# Patient Record
Sex: FEMALE | Race: WHITE | NOT HISPANIC OR LATINO | Employment: OTHER | URBAN - NONMETROPOLITAN AREA
[De-identification: names, ages, dates, MRNs, and addresses within clinical notes are randomized per-mention and may not be internally consistent; named-entity substitution may affect disease eponyms.]

---

## 2023-06-23 PROBLEM — R60.9 SWELLING: Status: ACTIVE | Noted: 2023-06-23

## 2023-06-23 PROBLEM — F51.01 PRIMARY INSOMNIA: Status: ACTIVE | Noted: 2023-06-23

## 2023-06-23 PROBLEM — I10 HYPERTENSION: Status: ACTIVE | Noted: 2023-06-23

## 2023-07-06 PROBLEM — E66.811 CLASS 1 OBESITY DUE TO EXCESS CALORIES WITHOUT SERIOUS COMORBIDITY WITH BODY MASS INDEX (BMI) OF 30.0 TO 30.9 IN ADULT: Status: ACTIVE | Noted: 2023-07-06

## 2023-07-06 PROBLEM — E66.09 CLASS 1 OBESITY DUE TO EXCESS CALORIES WITHOUT SERIOUS COMORBIDITY WITH BODY MASS INDEX (BMI) OF 30.0 TO 30.9 IN ADULT: Status: ACTIVE | Noted: 2023-07-06

## 2023-07-07 ENCOUNTER — TELEPHONE (OUTPATIENT)
Dept: FAMILY MEDICINE CLINIC | Facility: CLINIC | Age: 48
End: 2023-07-07

## 2023-07-07 NOTE — TELEPHONE ENCOUNTER
Caller: Vero Kraus    Relationship to patient: Self    Best call back number: [phone]     PATIENT IS CALLING TO STATE THAT INSURANCE NEEDS A PRIOR AUTHORIZATION FOR THE MEDICATION THAT DR SMITH PRESCRBED FOR HER.  IF THEY DO NOT APPROVE THIS MEDICATION, THEN THEY MIGHT APPROVE WYJENNIFER OR SAXENDA.

## 2023-07-10 NOTE — TELEPHONE ENCOUNTER
Caller: Vero Kraus    Relationship: Self    Best call back number:     [phone]       What was the call regarding:   PATIENT WOULD LIKE A CALL BACK REGARDING THE STATUS OF HER PRIOR AUTHORIZATION ON HER MEDICATION     Tirzepatide (Mounjaro) 2.5 MG/0.5ML solution pen-injector

## 2023-07-11 NOTE — TELEPHONE ENCOUNTER
Called patient to inform her of the PA denial on Mounjaro. Patient verbalized understanding and would like to discuss other options with Dr. Cook. I did inform patient that the [facility] rep told us it was on backorder.

## 2023-08-10 ENCOUNTER — OFFICE VISIT (OUTPATIENT)
Dept: FAMILY MEDICINE CLINIC | Facility: CLINIC | Age: 48
End: 2023-08-10
Payer: OTHER GOVERNMENT

## 2023-08-10 VITALS
HEART RATE: 78 BPM | WEIGHT: 181.8 LBS | DIASTOLIC BLOOD PRESSURE: 68 MMHG | BODY MASS INDEX: 30.29 KG/M2 | RESPIRATION RATE: 20 BRPM | HEIGHT: 65 IN | SYSTOLIC BLOOD PRESSURE: 108 MMHG | TEMPERATURE: 98 F | OXYGEN SATURATION: 94 %

## 2023-08-10 DIAGNOSIS — E66.09 CLASS 1 OBESITY DUE TO EXCESS CALORIES WITHOUT SERIOUS COMORBIDITY WITH BODY MASS INDEX (BMI) OF 30.0 TO 30.9 IN ADULT: Primary | ICD-10-CM

## 2023-08-10 PROCEDURE — 99214 OFFICE O/P EST MOD 30 MIN: CPT | Performed by: PSYCHOLOGIST

## 2023-08-10 RX ORDER — NALTREXONE HYDROCHLORIDE AND BUPROPION HYDROCHLORIDE 8; 90 MG/1; MG/1
TABLET, EXTENDED RELEASE ORAL
Qty: 120 TABLET | Refills: 0 | Status: SHIPPED | OUTPATIENT
Start: 2023-08-10

## 2023-08-10 NOTE — PROGRESS NOTES
"Chief Complaint  Follow-up (Obesity medication - Mounjaro was denied)    Subjective        Vero Kraus presents to [facility]  C/o follow up on obesity/ mounjaro not covered on her ins:  Obesity  This is a chronic problem. The current episode started more than 1 year ago. The problem occurs constantly. The problem has been gradually worsening. Associated symptoms include fatigue. Pertinent negatives include no chest pain, fever, headaches, joint swelling or vomiting. The symptoms are aggravated by walking. She has tried walking for the symptoms. The treatment provided mild relief.     Objective   Vital Signs:  /68 (BP Location: Left arm, Patient Position: Sitting, Cuff Size: Adult)   Pulse 78   Temp 98 øF (36.7 øC) (Temporal)   Resp 20   Ht 163.8 cm (64.5\")   Wt 82.5 kg (181 lb 12.8 oz)   SpO2 94%   BMI 30.72 kg/mý   Estimated body mass index is 30.72 kg/mý as calculated from the following:    Height as of this encounter: 163.8 cm (64.5\").    Weight as of this encounter: 82.5 kg (181 lb 12.8 oz).            Physical Exam  Vitals and nursing note reviewed.   Constitutional:       Appearance: Normal appearance. She is obese.   HENT:      Head: Normocephalic.      Right Ear: Tympanic membrane normal.      Left Ear: Tympanic membrane normal.      Nose: Nose normal.      Mouth/Throat:      Mouth: Mucous membranes are moist.   Eyes:      Extraocular Movements: Extraocular movements intact.      Pupils: Pupils are equal, round, and reactive to light.   Cardiovascular:      Rate and Rhythm: Normal rate and regular rhythm.      Pulses: Normal pulses.      Heart sounds: Normal heart sounds.   Pulmonary:      Effort: Pulmonary effort is normal.      Breath sounds: Normal breath sounds.   Abdominal:      General: Abdomen is protuberant. Bowel sounds are normal.      Palpations: Abdomen is soft.   Musculoskeletal:         General: Normal range of motion.      Cervical back: Normal " range of motion and neck supple.   Skin:     General: Skin is warm.      Capillary Refill: Capillary refill takes less than 2 seconds.   Neurological:      General: No focal deficit present.      Mental Status: She is alert and oriented to person, place, and time.   Psychiatric:         Mood and Affect: Mood normal.      Result Review :  The following data was reviewed by: Hilario Cook MD on 08/10/2023:  Common labs          6/23/2023    14:14   Common Labs   Glucose 88    BUN 14    Creatinine 0.79    Sodium 142    Potassium 4.6    Chloride 107    Calcium 9.2    Albumin 4.3    Total Bilirubin 0.3    Alkaline Phosphatase 73    AST (SGOT) 20    ALT (SGPT) 10    WBC 6.35    Hemoglobin 11.1    Hematocrit 37.3    Platelets 294    Total Cholesterol 150    Triglycerides 110    HDL Cholesterol 40    LDL Cholesterol  90    Hemoglobin A1C 5.50             Assessment and Plan   Diagnoses and all orders for this visit:    1. Class 1 obesity due to excess calories without serious comorbidity with body mass index (BMI) of 30.0 to 30.9 in adult (Primary)  Assessment & Plan:  Patient's (Body mass index is 30.72 kg/mý.) indicates that they are obese (BMI >30) with health conditions that include obstructive sleep apnea, hypertension, coronary heart disease, diabetes mellitus, dyslipidemias, GERD, and osteoarthritis . Weight is worsening. BMI  is above average; BMI management plan is completed. We discussed portion control and increasing exercise.     Will start a trial of Contrave  x 1 month and re-evaluate           Other orders  -     naltrexone-bupropion ER (Contrave) 8-90 MG tablet; Take 1 tab daily x  week then 1 tab bid x 1 week , the 2 tabs in am and  tab in pm then 2 tabs am & pm  Dispense: 120 tablet; Refill: 0           I spent 30 minutes caring for Vero on this date of service. This time includes time spent by me in the following activities:reviewing tests, obtaining and/or reviewing a separately obtained  history, performing a medically appropriate examination and/or evaluation , counseling and educating the patient/family/caregiver, ordering medications, tests, or procedures, and documenting information in the medical record     Counseling was given to patient for the following topics: risks and benefits of treatment options and importance of treatment compliance . Total time of the encounter was 30 minutes and 10 minutes was spent counseling.    Follow Up   No follow-ups on file.  Patient was given instructions and counseling regarding her condition or for health maintenance advice. Please see specific information pulled into the AVS if appropriate.           This document has been electronically signed by Hilario Cook MD  August 10, 2023 11:49 EDT

## 2023-08-10 NOTE — ASSESSMENT & PLAN NOTE
Patient's (Body mass index is 30.72 kg/mý.) indicates that they are obese (BMI >30) with health conditions that include obstructive sleep apnea, hypertension, coronary heart disease, diabetes mellitus, dyslipidemias, GERD, and osteoarthritis . Weight is worsening. BMI  is above average; BMI management plan is completed. We discussed portion control and increasing exercise.     Will start a trial of Contrave  x 1 month and re-evaluate

## 2023-08-16 ENCOUNTER — TELEPHONE (OUTPATIENT)
Dept: FAMILY MEDICINE CLINIC | Facility: CLINIC | Age: 48
End: 2023-08-16
Payer: OTHER GOVERNMENT

## 2023-08-18 RX ORDER — SEMAGLUTIDE 0.25 MG/.5ML
0.25 INJECTION, SOLUTION SUBCUTANEOUS WEEKLY
Qty: 0.5 ML | Refills: 0 | Status: SHIPPED | OUTPATIENT
Start: 2023-08-18 | End: 2023-09-09

## 2023-08-18 RX ORDER — SEMAGLUTIDE 0.25 MG/.5ML
0.25 INJECTION, SOLUTION SUBCUTANEOUS WEEKLY
Qty: 0.5 ML | Refills: 0 | Status: SHIPPED | OUTPATIENT
Start: 2023-08-18 | End: 2023-08-18 | Stop reason: SDUPTHER

## 2023-08-21 ENCOUNTER — PATIENT MESSAGE (OUTPATIENT)
Dept: FAMILY MEDICINE CLINIC | Facility: CLINIC | Age: 48
End: 2023-08-21
Payer: OTHER GOVERNMENT

## 2023-08-21 NOTE — TELEPHONE ENCOUNTER
From: Vero Kraus  To: Hilario Cook  Sent: 8/21/2023 9:47 AM EDT  Subject: Wugovy    Hi Dr [facility] no longer accepts  Insurance. [facility] and [facility] are the only other pharmacies that accept  and both of them don't have Wugovy in stock. It's on back order and they don't have a date that they will have it in stock. The only other option I can see for getting Wugovy is to go through [facility] which [facility]. We've never used them before so I have no idea how the process works with them other than they do home delivery. Can you try sending the Wugovy through them?    Thank you,   Vero Kraus

## 2023-08-25 RX ORDER — SEMAGLUTIDE 0.25 MG/.5ML
0.25 INJECTION, SOLUTION SUBCUTANEOUS WEEKLY
Qty: 0.5 ML | Refills: 0 | Status: SHIPPED | OUTPATIENT
Start: 2023-08-25 | End: 2023-09-16

## 2023-09-08 RX ORDER — LISINOPRIL AND HYDROCHLOROTHIAZIDE 20; 12.5 MG/1; MG/1
1 TABLET ORAL
Qty: 30 TABLET | Refills: 2 | Status: SHIPPED | OUTPATIENT
Start: 2023-09-08 | End: 2023-12-07

## 2023-10-19 ENCOUNTER — OFFICE VISIT (OUTPATIENT)
Dept: FAMILY MEDICINE CLINIC | Facility: CLINIC | Age: 48
End: 2023-10-19
Payer: OTHER GOVERNMENT

## 2023-10-19 VITALS
WEIGHT: 183 LBS | HEART RATE: 83 BPM | RESPIRATION RATE: 18 BRPM | OXYGEN SATURATION: 99 % | BODY MASS INDEX: 30.49 KG/M2 | DIASTOLIC BLOOD PRESSURE: 78 MMHG | HEIGHT: 65 IN | TEMPERATURE: 96.4 F | SYSTOLIC BLOOD PRESSURE: 125 MMHG

## 2023-10-19 DIAGNOSIS — E66.09 CLASS 1 OBESITY DUE TO EXCESS CALORIES WITHOUT SERIOUS COMORBIDITY WITH BODY MASS INDEX (BMI) OF 30.0 TO 30.9 IN ADULT: Primary | ICD-10-CM

## 2023-10-19 PROCEDURE — 99213 OFFICE O/P EST LOW 20 MIN: CPT | Performed by: PSYCHOLOGIST

## 2023-10-19 RX ORDER — PHENTERMINE AND TOPIRAMATE 7.5; 46 MG/1; MG/1
1 CAPSULE, EXTENDED RELEASE ORAL DAILY
Qty: 30 CAPSULE | Refills: 0 | Status: SHIPPED | OUTPATIENT
Start: 2023-10-19 | End: 2023-11-18

## 2023-10-19 NOTE — ASSESSMENT & PLAN NOTE
Patient's (Body mass index is 30.93 kg/m².) indicates that they are obese (BMI >30) with health conditions that include obstructive sleep apnea, hypertension, coronary heart disease, diabetes mellitus, dyslipidemias, GERD, and osteoarthritis . Weight is unchanged. BMI  is above average; BMI management plan is completed. We discussed portion control, increasing exercise, and joining a fitness center or start home based exercise program.

## 2023-10-19 NOTE — PROGRESS NOTES
"Chief Complaint  Follow-up (Follow up on Obesity - //Patient was suppose to start Wegovy in august 2023 but was unable to locate due to supply issues.)    Subjective        Vero Kraus presents to [facility]  C/o follow chronic illness:   Obesity  This is a chronic problem. The current episode started more than 1 year ago. The problem occurs constantly. The problem has been gradually worsening. Associated symptoms include fatigue. Pertinent negatives include no fever, headaches or joint swelling. She has tried walking for the symptoms. The treatment provided mild relief.       Objective   Vital Signs:  /78   Pulse 83   Temp 96.4 °F (35.8 °C) (Temporal)   Resp 18   Ht 163.8 cm (64.5\")   Wt 83 kg (183 lb)   SpO2 99%   BMI 30.93 kg/m²   Estimated body mass index is 30.93 kg/m² as calculated from the following:    Height as of this encounter: 163.8 cm (64.5\").    Weight as of this encounter: 83 kg (183 lb).       Physical Exam  Vitals and nursing note reviewed.   Constitutional:       Appearance: Normal appearance. She is obese.   HENT:      Head: Normocephalic.      Right Ear: Tympanic membrane normal.      Left Ear: Tympanic membrane normal.      Nose: Nose normal.      Mouth/Throat:      Mouth: Mucous membranes are moist.   Eyes:      Extraocular Movements: Extraocular movements intact.      Pupils: Pupils are equal, round, and reactive to light.   Cardiovascular:      Rate and Rhythm: Normal rate and regular rhythm.      Pulses: Normal pulses.      Heart sounds: Normal heart sounds.   Pulmonary:      Effort: Pulmonary effort is normal.      Breath sounds: Normal breath sounds.   Abdominal:      General: Abdomen is protuberant. Bowel sounds are normal.      Palpations: Abdomen is soft.   Musculoskeletal:         General: Normal range of motion.      Cervical back: Normal range of motion and neck supple.   Skin:     General: Skin is warm.      Capillary Refill: Capillary " refill takes less than 2 seconds.   Neurological:      General: No focal deficit present.      Mental Status: She is alert and oriented to person, place, and time.   Psychiatric:         Mood and Affect: Mood normal.        Result Review :  The following data was reviewed by: Hilario Cook MD on 10/19/2023:  Common labs          6/23/2023    14:14   Common Labs   Glucose 88    BUN 14    Creatinine 0.79    Sodium 142    Potassium 4.6    Chloride 107    Calcium 9.2    Albumin 4.3    Total Bilirubin 0.3    Alkaline Phosphatase 73    AST (SGOT) 20    ALT (SGPT) 10    WBC 6.35    Hemoglobin 11.1    Hematocrit 37.3    Platelets 294    Total Cholesterol 150    Triglycerides 110    HDL Cholesterol 40    LDL Cholesterol  90    Hemoglobin A1C 5.50      Data reviewed : Radiologic studies 6/23/23 LABS            Assessment and Plan   Diagnoses and all orders for this visit:    1. Class 1 obesity due to excess calories without serious comorbidity with body mass index (BMI) of 30.0 to 30.9 in adult (Primary)  Assessment & Plan:  Patient's (Body mass index is 30.93 kg/m².) indicates that they are obese (BMI >30) with health conditions that include obstructive sleep apnea, hypertension, coronary heart disease, diabetes mellitus, dyslipidemias, GERD, and osteoarthritis . Weight is unchanged. BMI  is above average; BMI management plan is completed. We discussed portion control, increasing exercise, and joining a fitness center or start home based exercise program.     Orders:  -     Phentermine-Topiramate (Qsymia) 7.5-46 MG capsule sustained-release 24 hr; Take 1 tablet by mouth Daily for 30 days.  Dispense: 30 capsule; Refill: 0           I spent 20 minutes caring for Vero on this date of service. This time includes time spent by me in the following activities:reviewing tests, obtaining and/or reviewing a separately obtained history, performing a medically appropriate examination and/or evaluation , counseling and educating  the patient/family/caregiver, ordering medications, tests, or procedures, and documenting information in the medical record       Follow Up   Return in about 1 year (around 10/19/2024) for Recheck-- obesity follow up .  Patient was given instructions and counseling regarding her condition or for health maintenance advice. Please see specific information pulled into the AVS if appropriate.           This document has been electronically signed by Hilario Cook MD  October 19, 2023 11:35 EDT

## 2023-12-07 ENCOUNTER — OFFICE VISIT (OUTPATIENT)
Dept: FAMILY MEDICINE CLINIC | Facility: CLINIC | Age: 48
End: 2023-12-07
Payer: OTHER GOVERNMENT

## 2023-12-07 VITALS
DIASTOLIC BLOOD PRESSURE: 64 MMHG | OXYGEN SATURATION: 97 % | TEMPERATURE: 98.2 F | HEIGHT: 65 IN | WEIGHT: 174.4 LBS | RESPIRATION RATE: 18 BRPM | HEART RATE: 81 BPM | BODY MASS INDEX: 29.06 KG/M2 | SYSTOLIC BLOOD PRESSURE: 102 MMHG

## 2023-12-07 DIAGNOSIS — F51.01 PRIMARY INSOMNIA: ICD-10-CM

## 2023-12-07 DIAGNOSIS — E66.09 CLASS 1 OBESITY DUE TO EXCESS CALORIES WITHOUT SERIOUS COMORBIDITY WITH BODY MASS INDEX (BMI) OF 30.0 TO 30.9 IN ADULT: ICD-10-CM

## 2023-12-07 DIAGNOSIS — I10 HYPERTENSION, UNSPECIFIED TYPE: Primary | ICD-10-CM

## 2023-12-07 PROCEDURE — 99213 OFFICE O/P EST LOW 20 MIN: CPT | Performed by: PSYCHOLOGIST

## 2023-12-07 NOTE — PROGRESS NOTES
"Chief Complaint  Follow-up (Cont. Care of obesity, HTN, insomnia./Patient does not want to take any controlled/habit-forming weight-loss medications.)    Subjective        Vero Kraus presents to [facility]  C/o follow up chronic illness :   Hypertension  This is a chronic problem. The current episode started more than 1 year ago. The problem has been resolved since onset. Pertinent negatives include no chest pain, headaches, palpitations or shortness of breath. Risk factors for coronary artery disease include post-menopausal state and obesity. Past treatments include lifestyle changes and ACE inhibitors. Current antihypertension treatment includes lifestyle changes and ACE inhibitors. The current treatment provides moderate improvement. There are no compliance problems.  There is no history of angina, kidney disease or CAD/MI. There is no history of chronic renal disease or a thyroid problem.   Insomnia  This is a chronic problem. The current episode started more than 1 year ago. The problem has been gradually improving. Pertinent negatives include no chest pain, fatigue, fever or headaches. She has tried rest for the symptoms. The treatment provided moderate relief.   Obesity  This is a chronic problem. The current episode started more than 1 year ago. The problem occurs constantly. The problem has been gradually improving. Pertinent negatives include no chest pain, fatigue, fever or headaches. She has tried walking for the symptoms. The treatment provided moderate relief.         Objective   Vital Signs:  /64   Pulse 81   Temp 98.2 °F (36.8 °C) (Temporal)   Resp 18   Ht 163.8 cm (64.5\")   Wt 79.1 kg (174 lb 6.4 oz)   SpO2 97%   BMI 29.47 kg/m²   Estimated body mass index is 29.47 kg/m² as calculated from the following:    Height as of this encounter: 163.8 cm (64.5\").    Weight as of this encounter: 79.1 kg (174 lb 6.4 oz).            Physical Exam  Vitals and nursing " note reviewed.   Constitutional:       Appearance: Normal appearance. She is obese.   HENT:      Head: Normocephalic.      Right Ear: Tympanic membrane normal.      Left Ear: Tympanic membrane normal.      Nose: Nose normal.      Mouth/Throat:      Mouth: Mucous membranes are moist.   Eyes:      Extraocular Movements: Extraocular movements intact.      Pupils: Pupils are equal, round, and reactive to light.   Cardiovascular:      Rate and Rhythm: Normal rate and regular rhythm.      Pulses: Normal pulses.      Heart sounds: Normal heart sounds.   Pulmonary:      Effort: Pulmonary effort is normal.      Breath sounds: Normal breath sounds.   Abdominal:      General: Abdomen is protuberant. Bowel sounds are normal.      Palpations: Abdomen is soft.   Musculoskeletal:         General: Normal range of motion.      Cervical back: Normal range of motion and neck supple.   Skin:     General: Skin is warm.      Capillary Refill: Capillary refill takes less than 2 seconds.   Neurological:      General: No focal deficit present.      Mental Status: She is alert and oriented to person, place, and time.   Psychiatric:         Mood and Affect: Mood normal.        Result Review :  The following data was reviewed by: Hilario Cook MD on 12/07/2023:  Common labs          6/23/2023    14:14   Common Labs   Glucose 88    BUN 14    Creatinine 0.79    Sodium 142    Potassium 4.6    Chloride 107    Calcium 9.2    Albumin 4.3    Total Bilirubin 0.3    Alkaline Phosphatase 73    AST (SGOT) 20    ALT (SGPT) 10    WBC 6.35    Hemoglobin 11.1    Hematocrit 37.3    Platelets 294    Total Cholesterol 150    Triglycerides 110    HDL Cholesterol 40    LDL Cholesterol  90    Hemoglobin A1C 5.50               Assessment and Plan   Diagnoses and all orders for this visit:    1. Hypertension, unspecified type (Primary)  Assessment & Plan:  Hypertension is improving with treatment.  Continue current treatment regimen.  Dietary sodium  restriction.  Weight loss.  Regular aerobic exercise.  Blood pressure will be reassessed at the next regular appointment.      2. Class 1 obesity due to excess calories without serious comorbidity with body mass index (BMI) of 30.0 to 30.9 in adult  Assessment & Plan:  Patient's (Body mass index is 29.47 kg/m².) indicates that they are obese (BMI >30) with health conditions that include obstructive sleep apnea, hypertension, coronary heart disease, diabetes mellitus, dyslipidemias, GERD, and osteoarthritis . Weight is improving with treatment. BMI  is above average; BMI management plan is completed. We discussed portion control, increasing exercise, and joining a fitness center or start home based exercise program.       3. Primary insomnia  Assessment & Plan:  Doing well with current med             I spent 20 minutes caring for Vero on this date of service. This time includes time spent by me in the following activities:reviewing tests, obtaining and/or reviewing a separately obtained history, performing a medically appropriate examination and/or evaluation , counseling and educating the patient/family/caregiver, ordering medications, tests, or procedures, and documenting information in the medical record     Follow Up   Return in about 3 months (around 3/7/2024) for Recheck, RTC FASTING LABS.  Patient was given instructions and counseling regarding her condition or for health maintenance advice. Please see specific information pulled into the AVS if appropriate.           This document has been electronically signed by Hilario Cook MD  December 7, 2023 14:01 EST

## 2023-12-14 RX ORDER — LISINOPRIL AND HYDROCHLOROTHIAZIDE 20; 12.5 MG/1; MG/1
1 TABLET ORAL
Qty: 30 TABLET | Refills: 2 | Status: SHIPPED | OUTPATIENT
Start: 2023-12-14 | End: 2024-03-13

## 2024-03-08 ENCOUNTER — OFFICE VISIT (OUTPATIENT)
Dept: FAMILY MEDICINE CLINIC | Facility: CLINIC | Age: 49
End: 2024-03-08
Payer: OTHER GOVERNMENT

## 2024-03-08 VITALS
OXYGEN SATURATION: 99 % | BODY MASS INDEX: 30.59 KG/M2 | HEART RATE: 64 BPM | DIASTOLIC BLOOD PRESSURE: 70 MMHG | HEIGHT: 65 IN | WEIGHT: 183.6 LBS | SYSTOLIC BLOOD PRESSURE: 110 MMHG

## 2024-03-08 DIAGNOSIS — Z12.11 SCREENING FOR COLON CANCER: ICD-10-CM

## 2024-03-08 DIAGNOSIS — I10 HYPERTENSION, UNSPECIFIED TYPE: Primary | ICD-10-CM

## 2024-03-08 DIAGNOSIS — F51.01 PRIMARY INSOMNIA: ICD-10-CM

## 2024-03-08 DIAGNOSIS — N95.1 CLIMACTERIC: ICD-10-CM

## 2024-03-08 LAB
ALBUMIN SERPL-MCNC: 4.3 G/DL (ref 3.5–5.2)
ALBUMIN/GLOB SERPL: 1.6 G/DL
ALP SERPL-CCNC: 71 U/L (ref 39–117)
ALT SERPL W P-5'-P-CCNC: 11 U/L (ref 1–33)
ANION GAP SERPL CALCULATED.3IONS-SCNC: 11.1 MMOL/L (ref 5–15)
AST SERPL-CCNC: 13 U/L (ref 1–32)
BASOPHILS # BLD AUTO: 0.06 10*3/MM3 (ref 0–0.2)
BASOPHILS NFR BLD AUTO: 1 % (ref 0–1.5)
BILIRUB SERPL-MCNC: 0.3 MG/DL (ref 0–1.2)
BUN SERPL-MCNC: 11 MG/DL (ref 6–20)
BUN/CREAT SERPL: 15.9 (ref 7–25)
CALCIUM SPEC-SCNC: 9.3 MG/DL (ref 8.6–10.5)
CHLORIDE SERPL-SCNC: 104 MMOL/L (ref 98–107)
CHOLEST SERPL-MCNC: 193 MG/DL (ref 0–200)
CO2 SERPL-SCNC: 26.9 MMOL/L (ref 22–29)
CREAT SERPL-MCNC: 0.69 MG/DL (ref 0.57–1)
DEPRECATED RDW RBC AUTO: 46.3 FL (ref 37–54)
EGFRCR SERPLBLD CKD-EPI 2021: 108.5 ML/MIN/1.73
EOSINOPHIL # BLD AUTO: 0.13 10*3/MM3 (ref 0–0.4)
EOSINOPHIL NFR BLD AUTO: 2.3 % (ref 0.3–6.2)
ERYTHROCYTE [DISTWIDTH] IN BLOOD BY AUTOMATED COUNT: 15.4 % (ref 12.3–15.4)
GLOBULIN UR ELPH-MCNC: 2.7 GM/DL
GLUCOSE SERPL-MCNC: 94 MG/DL (ref 65–99)
HBA1C MFR BLD: 5.2 % (ref 4.8–5.6)
HCT VFR BLD AUTO: 39 % (ref 34–46.6)
HDLC SERPL-MCNC: 38 MG/DL (ref 40–60)
HGB BLD-MCNC: 11.9 G/DL (ref 12–15.9)
IMM GRANULOCYTES # BLD AUTO: 0.01 10*3/MM3 (ref 0–0.05)
IMM GRANULOCYTES NFR BLD AUTO: 0.2 % (ref 0–0.5)
LDLC SERPL CALC-MCNC: 127 MG/DL (ref 0–100)
LDLC/HDLC SERPL: 3.26 {RATIO}
LYMPHOCYTES # BLD AUTO: 1.92 10*3/MM3 (ref 0.7–3.1)
LYMPHOCYTES NFR BLD AUTO: 33.4 % (ref 19.6–45.3)
MCH RBC QN AUTO: 24.9 PG (ref 26.6–33)
MCHC RBC AUTO-ENTMCNC: 30.5 G/DL (ref 31.5–35.7)
MCV RBC AUTO: 81.6 FL (ref 79–97)
MONOCYTES # BLD AUTO: 0.32 10*3/MM3 (ref 0.1–0.9)
MONOCYTES NFR BLD AUTO: 5.6 % (ref 5–12)
NEUTROPHILS NFR BLD AUTO: 3.3 10*3/MM3 (ref 1.7–7)
NEUTROPHILS NFR BLD AUTO: 57.5 % (ref 42.7–76)
NRBC BLD AUTO-RTO: 0 /100 WBC (ref 0–0.2)
PLATELET # BLD AUTO: 336 10*3/MM3 (ref 140–450)
PMV BLD AUTO: 10.7 FL (ref 6–12)
POTASSIUM SERPL-SCNC: 3.8 MMOL/L (ref 3.5–5.2)
PROT SERPL-MCNC: 7 G/DL (ref 6–8.5)
RBC # BLD AUTO: 4.78 10*6/MM3 (ref 3.77–5.28)
SODIUM SERPL-SCNC: 142 MMOL/L (ref 136–145)
TRIGL SERPL-MCNC: 156 MG/DL (ref 0–150)
VLDLC SERPL-MCNC: 28 MG/DL (ref 5–40)
WBC NRBC COR # BLD AUTO: 5.74 10*3/MM3 (ref 3.4–10.8)

## 2024-03-08 PROCEDURE — 80061 LIPID PANEL: CPT | Performed by: PSYCHOLOGIST

## 2024-03-08 PROCEDURE — 80053 COMPREHEN METABOLIC PANEL: CPT | Performed by: PSYCHOLOGIST

## 2024-03-08 PROCEDURE — 82306 VITAMIN D 25 HYDROXY: CPT | Performed by: PSYCHOLOGIST

## 2024-03-08 PROCEDURE — 83036 HEMOGLOBIN GLYCOSYLATED A1C: CPT | Performed by: PSYCHOLOGIST

## 2024-03-08 PROCEDURE — 85025 COMPLETE CBC W/AUTO DIFF WBC: CPT | Performed by: PSYCHOLOGIST

## 2024-03-08 RX ORDER — NAPROXEN 500 MG/1
500 TABLET ORAL 2 TIMES DAILY WITH MEALS
Qty: 60 TABLET | Refills: 1 | Status: SHIPPED | OUTPATIENT
Start: 2024-03-08 | End: 2024-05-07

## 2024-03-08 RX ORDER — LISINOPRIL AND HYDROCHLOROTHIAZIDE 20; 12.5 MG/1; MG/1
1 TABLET ORAL
Qty: 90 TABLET | Refills: 1 | Status: SHIPPED | OUTPATIENT
Start: 2024-03-08 | End: 2024-09-04

## 2024-03-08 NOTE — PROGRESS NOTES
"Chief Complaint  Follow-up (CONTINUAL CARE FOR HTN / FASTING LABS ( NO ORDERS IN CHART ) - NEEDING REFILL ON MEDS.//COLORECTAL SCREENING DUE ??)    Subjective        Vero Kraus presents to [facility]  C/O FOLLOW UP CHRONIC ILLNESS   Hypertension  This is a chronic problem. The current episode started more than 1 year ago. The problem has been stable since onset. The problem is controlled. Pertinent negatives include no chest pain, headaches, palpitations or sweats. Risk factors for coronary artery disease include obesity. Past treatments include lifestyle changes, ACE inhibitors and diuretics. Current antihypertension treatment includes lifestyle changes, ACE inhibitors and diuretics. The current treatment provides moderate improvement. There is no history of angina, kidney disease or CAD/MI. There is no history of chronic renal disease or a thyroid problem.   Insomnia  This is a chronic problem. The current episode started more than 1 year ago. The problem occurs intermittently. The problem has been gradually improving. Pertinent negatives include no chest pain, fatigue, fever, headaches, nausea or vomiting. She has tried rest and sleep for the symptoms. The treatment provided moderate relief.       Objective   Vital Signs:  /70   Pulse 64   Ht 163.8 cm (64.5\")   Wt 83.3 kg (183 lb 9.6 oz)   SpO2 99%   BMI 31.03 kg/m²   Estimated body mass index is 31.03 kg/m² as calculated from the following:    Height as of this encounter: 163.8 cm (64.5\").    Weight as of this encounter: 83.3 kg (183 lb 9.6 oz).            Physical Exam  Vitals and nursing note reviewed.   Constitutional:       Appearance: Normal appearance. She is obese.   HENT:      Head: Normocephalic.      Right Ear: Tympanic membrane normal.      Left Ear: Tympanic membrane normal.      Nose: Nose normal.      Mouth/Throat:      Mouth: Mucous membranes are moist.   Eyes:      Extraocular Movements: Extraocular " movements intact.      Pupils: Pupils are equal, round, and reactive to light.   Cardiovascular:      Rate and Rhythm: Normal rate and regular rhythm.      Pulses: Normal pulses.      Heart sounds: Normal heart sounds.   Pulmonary:      Effort: Pulmonary effort is normal.      Breath sounds: Normal breath sounds.   Abdominal:      General: Abdomen is protuberant. Bowel sounds are normal.      Palpations: Abdomen is soft.   Musculoskeletal:         General: Normal range of motion.      Cervical back: Normal range of motion and neck supple.   Skin:     General: Skin is warm.      Capillary Refill: Capillary refill takes less than 2 seconds.   Neurological:      General: No focal deficit present.      Mental Status: She is alert and oriented to person, place, and time.   Psychiatric:         Mood and Affect: Mood normal.        Result Review :  The following data was reviewed by: Hilario Cook MD on 03/08/2024:  Common labs          6/23/2023    14:14   Common Labs   Glucose 88    BUN 14    Creatinine 0.79    Sodium 142    Potassium 4.6    Chloride 107    Calcium 9.2    Albumin 4.3    Total Bilirubin 0.3    Alkaline Phosphatase 73    AST (SGOT) 20    ALT (SGPT) 10    WBC 6.35    Hemoglobin 11.1    Hematocrit 37.3    Platelets 294    Total Cholesterol 150    Triglycerides 110    HDL Cholesterol 40    LDL Cholesterol  90    Hemoglobin A1C 5.50             Assessment and Plan   Diagnoses and all orders for this visit:    1. Hypertension, unspecified type (Primary)  Assessment & Plan:  Hypertension is stable and controlled  Continue current treatment regimen.  Dietary sodium restriction.  Weight loss.  Regular aerobic exercise.  Blood pressure will be reassessed in 6 months.    Orders:  -     Vitamin D,25-Hydroxy  -     CBC & Differential  -     Lipid Panel  -     Comprehensive Metabolic Panel  -     Hemoglobin A1c    2. Primary insomnia  Assessment & Plan:  CURRENTLY DOING WELL WITH MELATONIN     Orders:  -      Vitamin D,25-Hydroxy  -     CBC & Differential  -     Lipid Panel  -     Comprehensive Metabolic Panel  -     Hemoglobin A1c    3. Screening for colon cancer  -     Ambulatory Referral For Screening Colonoscopy    4. Climacteric    Other orders  -     lisinopril-hydrochlorothiazide (PRINZIDE,ZESTORETIC) 20-12.5 MG per tablet; Take 1 tablet by mouth Daily With Breakfast for 180 days.  Dispense: 90 tablet; Refill: 1  -     naproxen (Naprosyn) 500 MG tablet; Take 1 tablet by mouth 2 (Two) Times a Day With Meals for 60 days.  Dispense: 60 tablet; Refill: 1           I spent 30 minutes caring for Vero on this date of service. This time includes time spent by me in the following activities:reviewing tests, obtaining and/or reviewing a separately obtained history, performing a medically appropriate examination and/or evaluation , counseling and educating the patient/family/caregiver, ordering medications, tests, or procedures, and documenting information in the medical record       Follow Up   Return in about 4 months (around 6/25/2024) for Annual physical, RTC FASTING LABS.  Patient was given instructions and counseling regarding her condition or for health maintenance advice. Please see specific information pulled into the AVS if appropriate.           This document has been electronically signed by Hilario Cook MD  March 8, 2024 13:19 EST

## 2024-03-09 LAB — 25(OH)D3 SERPL-MCNC: 27.7 NG/ML (ref 30–100)

## 2024-07-05 ENCOUNTER — OFFICE VISIT (OUTPATIENT)
Dept: FAMILY MEDICINE CLINIC | Facility: CLINIC | Age: 49
End: 2024-07-05
Payer: OTHER GOVERNMENT

## 2024-07-05 VITALS
OXYGEN SATURATION: 95 % | HEIGHT: 65 IN | DIASTOLIC BLOOD PRESSURE: 67 MMHG | HEART RATE: 68 BPM | WEIGHT: 164 LBS | RESPIRATION RATE: 20 BRPM | TEMPERATURE: 96.4 F | SYSTOLIC BLOOD PRESSURE: 113 MMHG | BODY MASS INDEX: 27.32 KG/M2

## 2024-07-05 DIAGNOSIS — Z00.00 ENCOUNTER FOR ANNUAL PHYSICAL EXAM: Primary | ICD-10-CM

## 2024-07-05 DIAGNOSIS — I10 HYPERTENSION, UNSPECIFIED TYPE: ICD-10-CM

## 2024-07-05 DIAGNOSIS — F51.01 PRIMARY INSOMNIA: ICD-10-CM

## 2024-07-05 LAB
ALBUMIN SERPL-MCNC: 4.4 G/DL (ref 3.5–5.2)
ALBUMIN/GLOB SERPL: 1.6 G/DL
ALP SERPL-CCNC: 49 U/L (ref 39–117)
ALT SERPL W P-5'-P-CCNC: 11 U/L (ref 1–33)
ANION GAP SERPL CALCULATED.3IONS-SCNC: 11.7 MMOL/L (ref 5–15)
AST SERPL-CCNC: 14 U/L (ref 1–32)
BASOPHILS # BLD AUTO: 0.06 10*3/MM3 (ref 0–0.2)
BASOPHILS NFR BLD AUTO: 0.8 % (ref 0–1.5)
BILIRUB BLD-MCNC: ABNORMAL MG/DL
BILIRUB SERPL-MCNC: 0.2 MG/DL (ref 0–1.2)
BUN SERPL-MCNC: 15 MG/DL (ref 6–20)
BUN/CREAT SERPL: 20.5 (ref 7–25)
CALCIUM SPEC-SCNC: 9.3 MG/DL (ref 8.6–10.5)
CHLORIDE SERPL-SCNC: 105 MMOL/L (ref 98–107)
CHOLEST SERPL-MCNC: 154 MG/DL (ref 0–200)
CLARITY, POC: CLEAR
CO2 SERPL-SCNC: 24.3 MMOL/L (ref 22–29)
COLOR UR: YELLOW
CREAT SERPL-MCNC: 0.73 MG/DL (ref 0.57–1)
DEPRECATED RDW RBC AUTO: 46.5 FL (ref 37–54)
EGFRCR SERPLBLD CKD-EPI 2021: 102.2 ML/MIN/1.73
EOSINOPHIL # BLD AUTO: 0.32 10*3/MM3 (ref 0–0.4)
EOSINOPHIL NFR BLD AUTO: 4.3 % (ref 0.3–6.2)
ERYTHROCYTE [DISTWIDTH] IN BLOOD BY AUTOMATED COUNT: 14.8 % (ref 12.3–15.4)
GLOBULIN UR ELPH-MCNC: 2.7 GM/DL
GLUCOSE SERPL-MCNC: 87 MG/DL (ref 65–99)
GLUCOSE UR STRIP-MCNC: NEGATIVE MG/DL
HBA1C MFR BLD: 5.1 % (ref 4.8–5.6)
HCT VFR BLD AUTO: 38.5 % (ref 34–46.6)
HDLC SERPL-MCNC: 46 MG/DL (ref 40–60)
HGB BLD-MCNC: 12.2 G/DL (ref 12–15.9)
IMM GRANULOCYTES # BLD AUTO: 0.02 10*3/MM3 (ref 0–0.05)
IMM GRANULOCYTES NFR BLD AUTO: 0.3 % (ref 0–0.5)
KETONES UR QL: NEGATIVE
LDLC SERPL CALC-MCNC: 92 MG/DL (ref 0–100)
LDLC/HDLC SERPL: 1.97 {RATIO}
LEUKOCYTE EST, POC: NEGATIVE
LYMPHOCYTES # BLD AUTO: 2.11 10*3/MM3 (ref 0.7–3.1)
LYMPHOCYTES NFR BLD AUTO: 28.2 % (ref 19.6–45.3)
MCH RBC QN AUTO: 27 PG (ref 26.6–33)
MCHC RBC AUTO-ENTMCNC: 31.7 G/DL (ref 31.5–35.7)
MCV RBC AUTO: 85.2 FL (ref 79–97)
MONOCYTES # BLD AUTO: 0.4 10*3/MM3 (ref 0.1–0.9)
MONOCYTES NFR BLD AUTO: 5.3 % (ref 5–12)
NEUTROPHILS NFR BLD AUTO: 4.57 10*3/MM3 (ref 1.7–7)
NEUTROPHILS NFR BLD AUTO: 61.1 % (ref 42.7–76)
NITRITE UR-MCNC: NEGATIVE MG/ML
NRBC BLD AUTO-RTO: 0 /100 WBC (ref 0–0.2)
PH UR: 6 [PH] (ref 5–8)
PLATELET # BLD AUTO: 293 10*3/MM3 (ref 140–450)
PMV BLD AUTO: 11.1 FL (ref 6–12)
POTASSIUM SERPL-SCNC: 3.6 MMOL/L (ref 3.5–5.2)
PROT SERPL-MCNC: 7.1 G/DL (ref 6–8.5)
PROT UR STRIP-MCNC: NEGATIVE MG/DL
RBC # BLD AUTO: 4.52 10*6/MM3 (ref 3.77–5.28)
RBC # UR STRIP: NEGATIVE /UL
SODIUM SERPL-SCNC: 141 MMOL/L (ref 136–145)
SP GR UR: 1.02 (ref 1–1.03)
TRIGL SERPL-MCNC: 86 MG/DL (ref 0–150)
TSH SERPL DL<=0.05 MIU/L-ACNC: 0.91 UIU/ML (ref 0.27–4.2)
UROBILINOGEN UR QL: NORMAL
VLDLC SERPL-MCNC: 16 MG/DL (ref 5–40)
WBC NRBC COR # BLD AUTO: 7.48 10*3/MM3 (ref 3.4–10.8)

## 2024-07-05 PROCEDURE — 80053 COMPREHEN METABOLIC PANEL: CPT | Performed by: PSYCHOLOGIST

## 2024-07-05 PROCEDURE — 80061 LIPID PANEL: CPT | Performed by: PSYCHOLOGIST

## 2024-07-05 PROCEDURE — 82607 VITAMIN B-12: CPT | Performed by: PSYCHOLOGIST

## 2024-07-05 PROCEDURE — 83036 HEMOGLOBIN GLYCOSYLATED A1C: CPT | Performed by: PSYCHOLOGIST

## 2024-07-05 PROCEDURE — 81002 URINALYSIS NONAUTO W/O SCOPE: CPT | Performed by: PSYCHOLOGIST

## 2024-07-05 PROCEDURE — 85025 COMPLETE CBC W/AUTO DIFF WBC: CPT | Performed by: PSYCHOLOGIST

## 2024-07-05 PROCEDURE — 99396 PREV VISIT EST AGE 40-64: CPT | Performed by: PSYCHOLOGIST

## 2024-07-05 PROCEDURE — 84443 ASSAY THYROID STIM HORMONE: CPT | Performed by: PSYCHOLOGIST

## 2024-07-05 PROCEDURE — 82306 VITAMIN D 25 HYDROXY: CPT | Performed by: PSYCHOLOGIST

## 2024-07-05 NOTE — PROGRESS NOTES
"Chief Complaint  Annual Exam (FASTING FOR BLOOD WORK. )    Subjective        Vero Kraus presents to [facility] PRIMARY [facility]ARE  C/O 1. ANNUAL EXAM 2. FASTING LABS  Hypertension  This is a chronic problem. The current episode started more than 1 year ago. The problem has been stable since onset. The problem is controlled. Pertinent negatives include no chest pain, headaches, palpitations or shortness of breath. Risk factors for coronary artery disease include obesity and post-menopausal state. Past treatments include lifestyle changes, diuretics and ACE inhibitors. Current antihypertension treatment includes lifestyle changes, diuretics and ACE inhibitors. The current treatment provides moderate improvement. There are no compliance problems.  There is no history of angina, kidney disease or CAD/MI. There is no history of chronic renal disease or a thyroid problem.   Insomnia  This is a chronic problem. The current episode started more than 1 year ago. The problem occurs intermittently. The problem has been gradually improving. Pertinent negatives include no chest pain, headaches, nausea or vomiting. The treatment provided moderate relief.       Objective   Vital Signs:  /67 (BP Location: Left arm, Patient Position: Sitting, Cuff Size: Adult)   Pulse 68   Temp 96.4 °F (35.8 °C) (Temporal)   Resp 20   Ht 163.8 cm (64.5\")   Wt 74.4 kg (164 lb)   SpO2 95%   BMI 27.72 kg/m²   Estimated body mass index is 27.72 kg/m² as calculated from the following:    Height as of this encounter: 163.8 cm (64.5\").    Weight as of this encounter: 74.4 kg (164 lb).        Physical Exam  Vitals and nursing note reviewed. Exam conducted with a chaperone present.   Constitutional:       General: She is awake.      Appearance: Normal appearance. She is well-developed and well-groomed. She is obese.   HENT:      Head: Normocephalic and atraumatic.      Jaw: There is normal jaw occlusion.      Right Ear: Hearing, " tympanic membrane, ear canal and external ear normal.      Left Ear: Hearing, tympanic membrane, ear canal and external ear normal.      Nose: Nose normal.      Mouth/Throat:      Lips: Pink.      Mouth: Mucous membranes are moist.      Pharynx: Oropharynx is clear.   Eyes:      General: Lids are normal. Vision grossly intact. Gaze aligned appropriately.      Extraocular Movements: Extraocular movements intact.      Conjunctiva/sclera: Conjunctivae normal.      Pupils: Pupils are equal, round, and reactive to light.   Neck:      Trachea: Trachea and phonation normal.   Cardiovascular:      Rate and Rhythm: Normal rate and regular rhythm.      Pulses: Normal pulses.      Heart sounds: Normal heart sounds.   Pulmonary:      Effort: Pulmonary effort is normal.      Breath sounds: Normal breath sounds and air entry.   Abdominal:      General: Abdomen is protuberant. Bowel sounds are normal.      Palpations: Abdomen is soft.   Genitourinary:     Rectum: Normal.   Musculoskeletal:         General: Normal range of motion.      Right shoulder: Normal.      Left shoulder: Normal.      Right upper arm: Normal.      Left upper arm: Normal.      Right elbow: Normal.      Left elbow: Normal.      Right forearm: Normal.      Left forearm: Normal.      Right wrist: Normal.      Left wrist: Normal.      Right hand: Normal.      Left hand: Normal.      Cervical back: Normal, full passive range of motion without pain, normal range of motion and neck supple.      Thoracic back: Normal.      Lumbar back: Normal.      Right hip: Normal.      Left hip: Normal.      Right upper leg: Normal.      Left upper leg: Normal.      Right knee: Normal.      Left knee: Normal.      Right lower leg: Normal. No edema.      Left lower leg: Normal. No edema.      Right ankle: Normal.      Right Achilles Tendon: Normal.      Left ankle: Normal.      Left Achilles Tendon: Normal.      Right foot: Normal.      Left foot: Normal.   Lymphadenopathy:       Cervical: No cervical adenopathy.   Skin:     General: Skin is warm.      Capillary Refill: Capillary refill takes less than 2 seconds.   Neurological:      General: No focal deficit present.      Mental Status: She is alert and oriented to person, place, and time.      Cranial Nerves: Cranial nerves 2-12 are intact.      Sensory: Sensation is intact.      Motor: Motor function is intact.      Coordination: Coordination is intact.      Gait: Gait is intact.      Deep Tendon Reflexes: Reflexes are normal and symmetric.   Psychiatric:         Attention and Perception: Attention and perception normal.         Mood and Affect: Mood and affect normal.         Speech: Speech normal.         Behavior: Behavior normal. Behavior is cooperative.         Thought Content: Thought content normal.         Cognition and Memory: Cognition and memory normal.         Judgment: Judgment normal.        Result Review :  The following data was reviewed by: Hilario Cook MD on 07/05/2024:  Common labs          3/8/2024    13:28   Common Labs   Glucose 94    BUN 11    Creatinine 0.69    Sodium 142    Potassium 3.8    Chloride 104    Calcium 9.3    Albumin 4.3    Total Bilirubin 0.3    Alkaline Phosphatase 71    AST (SGOT) 13    ALT (SGPT) 11    WBC 5.74    Hemoglobin 11.9    Hematocrit 39.0    Platelets 336    Total Cholesterol 193    Triglycerides 156    HDL Cholesterol 38    LDL Cholesterol  127    Hemoglobin A1C 5.20             Assessment and Plan   Diagnoses and all orders for this visit:    1. Encounter for annual physical exam (Primary)  -     CBC & Differential  -     Comprehensive Metabolic Panel  -     Hemoglobin A1c  -     Lipid Panel  -     TSH  -     POC Urinalysis Dipstick  -     Vitamin D,25-Hydroxy  -     Vitamin B12    2. Hypertension, unspecified type  Assessment & Plan:  Hypertension is stable and controlled  Continue current treatment regimen.  Dietary sodium restriction.  Weight loss.  Regular aerobic  exercise.  Blood pressure will be reassessed in 6 months.    Orders:  -     CBC & Differential  -     Comprehensive Metabolic Panel  -     Hemoglobin A1c  -     Lipid Panel  -     TSH  -     POC Urinalysis Dipstick  -     Vitamin D,25-Hydroxy  -     Vitamin B12    3. Primary insomnia  Assessment & Plan:  Continue OTC Melatonin     Orders:  -     CBC & Differential  -     Comprehensive Metabolic Panel  -     Hemoglobin A1c  -     Lipid Panel  -     TSH  -     POC Urinalysis Dipstick  -     Vitamin D,25-Hydroxy  -     Vitamin B12           I spent 30 minutes caring for Vero on this date of service. This time includes time spent by me in the following activities:reviewing tests, obtaining and/or reviewing a separately obtained history, performing a medically appropriate examination and/or evaluation , counseling and educating the patient/family/caregiver, ordering medications, tests, or procedures, and documenting information in the medical record     The preventive exam has been reviewed in detail.  The patient has been fully counseled on preventative guidelines for vaccines, cancer screenings, and other health maintenance needs.   The patient has been counseled on guidelines for maintaining a lifestyle to promote good health and to minimize chronic diseases.  The patient has been assisted with scheduling these healthcare procedures for the coming year and given a written document of health maintenance and anticipatory guidance for age with the AVS.       Follow Up   Return in about 1 year (around 7/7/2025) for Annual physical, RTC FASTING LABS.  Patient was given instructions and counseling regarding her condition or for health maintenance advice. Please see specific information pulled into the AVS if appropriate.           This document has been electronically signed by Hilario Cook MD  July 5, 2024 13:45 EDT

## 2024-07-06 LAB
25(OH)D3 SERPL-MCNC: 30.5 NG/ML (ref 30–100)
VIT B12 BLD-MCNC: 346 PG/ML (ref 211–946)

## 2024-07-26 ENCOUNTER — OFFICE VISIT (OUTPATIENT)
Dept: FAMILY MEDICINE CLINIC | Facility: CLINIC | Age: 49
End: 2024-07-26
Payer: OTHER GOVERNMENT

## 2024-07-26 VITALS
OXYGEN SATURATION: 97 % | SYSTOLIC BLOOD PRESSURE: 120 MMHG | BODY MASS INDEX: 26.73 KG/M2 | DIASTOLIC BLOOD PRESSURE: 60 MMHG | WEIGHT: 160.4 LBS | TEMPERATURE: 97 F | RESPIRATION RATE: 20 BRPM | HEIGHT: 65 IN | HEART RATE: 79 BPM

## 2024-07-26 DIAGNOSIS — N95.1 PERI-MENOPAUSE: Primary | ICD-10-CM

## 2024-07-26 DIAGNOSIS — R10.30 LOWER ABDOMINAL PAIN: ICD-10-CM

## 2024-07-26 LAB — TSH SERPL DL<=0.05 MIU/L-ACNC: 1.03 UIU/ML (ref 0.27–4.2)

## 2024-07-26 PROCEDURE — 83002 ASSAY OF GONADOTROPIN (LH): CPT | Performed by: PSYCHOLOGIST

## 2024-07-26 PROCEDURE — 84402 ASSAY OF FREE TESTOSTERONE: CPT | Performed by: PSYCHOLOGIST

## 2024-07-26 PROCEDURE — 99214 OFFICE O/P EST MOD 30 MIN: CPT | Performed by: PSYCHOLOGIST

## 2024-07-26 PROCEDURE — 82627 DEHYDROEPIANDROSTERONE: CPT | Performed by: PSYCHOLOGIST

## 2024-07-26 PROCEDURE — 84146 ASSAY OF PROLACTIN: CPT | Performed by: PSYCHOLOGIST

## 2024-07-26 PROCEDURE — 84443 ASSAY THYROID STIM HORMONE: CPT | Performed by: PSYCHOLOGIST

## 2024-07-26 PROCEDURE — 84144 ASSAY OF PROGESTERONE: CPT | Performed by: PSYCHOLOGIST

## 2024-07-26 PROCEDURE — 82670 ASSAY OF TOTAL ESTRADIOL: CPT | Performed by: PSYCHOLOGIST

## 2024-07-26 PROCEDURE — 83001 ASSAY OF GONADOTROPIN (FSH): CPT | Performed by: PSYCHOLOGIST

## 2024-07-26 NOTE — PROGRESS NOTES
"Chief Complaint  Menstrual Problem (She is having really bad period pains she is wanting an ultrasound done on her ovaries and tested for her ovarian cancer. And possible hormone testing. )    Subjective        Vero Kraus presents to [facility]  C/o an acute medical concern with alessandro menopause:   Menstrual Problem  This is a recurrent problem. The current episode started more than 1 month ago. The problem occurs constantly. The problem has been gradually worsening. Associated symptoms include fatigue, headaches and vomiting. Pertinent negatives include no congestion, coughing, fever, joint swelling or nausea. Nothing aggravates the symptoms. She has tried NSAIDs for the symptoms. The treatment provided mild relief.       Objective   Vital Signs:  /60 (BP Location: Left arm, Patient Position: Sitting, Cuff Size: Infant)   Pulse 79   Temp 97 °F (36.1 °C) (Temporal)   Resp 20   Ht 163.8 cm (64.5\")   Wt 72.8 kg (160 lb 6.4 oz)   SpO2 97%   BMI 27.11 kg/m²   Estimated body mass index is 27.11 kg/m² as calculated from the following:    Height as of this encounter: 163.8 cm (64.5\").    Weight as of this encounter: 72.8 kg (160 lb 6.4 oz).        Physical Exam  Vitals and nursing note reviewed.   Constitutional:       Appearance: Normal appearance. She is obese.   HENT:      Head: Normocephalic.      Right Ear: Tympanic membrane normal.      Left Ear: Tympanic membrane normal.      Nose: Nose normal.      Mouth/Throat:      Mouth: Mucous membranes are moist.   Eyes:      Extraocular Movements: Extraocular movements intact.      Pupils: Pupils are equal, round, and reactive to light.   Cardiovascular:      Rate and Rhythm: Normal rate and regular rhythm.      Pulses: Normal pulses.      Heart sounds: Normal heart sounds.   Pulmonary:      Effort: Pulmonary effort is normal.      Breath sounds: Normal breath sounds.   Abdominal:      General: Abdomen is protuberant. Bowel sounds " are normal.      Palpations: Abdomen is soft.      Tenderness: There is abdominal tenderness in the left lower quadrant. There is no guarding or rebound. Negative signs include Terrell's sign and Rovsing's sign.   Musculoskeletal:         General: Normal range of motion.      Cervical back: Normal range of motion and neck supple.   Skin:     General: Skin is warm.      Capillary Refill: Capillary refill takes less than 2 seconds.   Neurological:      General: No focal deficit present.      Mental Status: She is alert and oriented to person, place, and time.   Psychiatric:         Mood and Affect: Mood normal.        Result Review :  The following data was reviewed by: Hilario Cook MD on 07/26/2024:  Common labs          3/8/2024    13:28 7/5/2024    13:54   Common Labs   Glucose 94  87    BUN 11  15    Creatinine 0.69  0.73    Sodium 142  141    Potassium 3.8  3.6    Chloride 104  105    Calcium 9.3  9.3    Albumin 4.3  4.4    Total Bilirubin 0.3  0.2    Alkaline Phosphatase 71  49    AST (SGOT) 13  14    ALT (SGPT) 11  11    WBC 5.74  7.48    Hemoglobin 11.9  12.2    Hematocrit 39.0  38.5    Platelets 336  293    Total Cholesterol 193  154    Triglycerides 156  86    HDL Cholesterol 38  46    LDL Cholesterol  127  92    Hemoglobin A1C 5.20  5.10               Assessment and Plan   Diagnoses and all orders for this visit:    1. Jaja-menopause (Primary)  Comments:  Advised to take OTC/ herbal meds  Orders:  -     DHEA-sulfate; Future  -     Follicle stimulating hormone; Future  -     Luteinizing hormone; Future  -     Prolactin; Future  -     Testosterone Free Direct; Future  -     TSH; Future  -     US Abdomen Complete; Future  -     US Non-ob Transvaginal; Future  -     Estradiol; Future  -     Progesterone; Future    2. Lower abdominal pain  Comments:  WILL ORDER TRANSVAGINAL US  Orders:  -     DHEA-sulfate; Future  -     Follicle stimulating hormone; Future  -     Luteinizing hormone; Future  -      Prolactin; Future  -     Testosterone Free Direct; Future  -     TSH; Future  -     US Abdomen Complete; Future  -     US Non-ob Transvaginal; Future  -     Estradiol; Future  -     Progesterone; Future           I spent 30 minutes caring for Vero on this date of service. This time includes time spent by me in the following activities:reviewing tests, obtaining and/or reviewing a separately obtained history, performing a medically appropriate examination and/or evaluation , counseling and educating the patient/family/caregiver, ordering medications, tests, or procedures, and documenting information in the medical record       Follow Up   Return in about 1 year (around 7/8/2025) for Annual physical, RTC FASTING LABS & 1 MONTH FF/UP ABDOMINAL PAIN.  Patient was given instructions and counseling regarding her condition or for health maintenance advice. Please see specific information pulled into the AVS if appropriate.         This document has been electronically signed by Hilario Cook MD  July 26, 2024 09:21 EDT       This document has been electronically signed by Hilario Cook MD  July 26, 2024 09:21 EDT

## 2024-07-27 LAB
DHEA-S SERPL-MCNC: 42.5 UG/DL (ref 41.2–243.7)
ESTRADIOL SERPL HS-MCNC: 360 PG/ML
FSH SERPL-ACNC: 8.67 MIU/ML
LH SERPL-ACNC: 22 MIU/ML
PROGEST SERPL-MCNC: 0.27 NG/ML
PROLACTIN SERPL-MCNC: 23.4 NG/ML (ref 4.79–23.3)

## 2024-07-29 LAB — TESTOST FREE SERPL-MCNC: 0.4 PG/ML (ref 0–4.2)

## 2024-08-19 DIAGNOSIS — N85.8 UTERINE MASS: Primary | ICD-10-CM

## 2024-08-26 ENCOUNTER — OFFICE VISIT (OUTPATIENT)
Dept: FAMILY MEDICINE CLINIC | Facility: CLINIC | Age: 49
End: 2024-08-26
Payer: OTHER GOVERNMENT

## 2024-08-26 VITALS
TEMPERATURE: 97.3 F | BODY MASS INDEX: 26.59 KG/M2 | SYSTOLIC BLOOD PRESSURE: 100 MMHG | WEIGHT: 159.6 LBS | HEIGHT: 65 IN | DIASTOLIC BLOOD PRESSURE: 60 MMHG

## 2024-08-26 DIAGNOSIS — Z12.31 ENCOUNTER FOR SCREENING MAMMOGRAM FOR MALIGNANT NEOPLASM OF BREAST: ICD-10-CM

## 2024-08-26 DIAGNOSIS — R10.30 LOWER ABDOMINAL PAIN: Primary | ICD-10-CM

## 2024-08-26 PROCEDURE — 99214 OFFICE O/P EST MOD 30 MIN: CPT | Performed by: PSYCHOLOGIST

## 2024-08-26 NOTE — PROGRESS NOTES
"Chief Complaint  Abdominal Pain (1 MONTH F/U AND FASTING )    Subjective        Vero Kraus presents to [facility]  C/O AN ABDOMINAL PAIN  2. US RESULTS  Abdominal Pain  This is a chronic problem. The current episode started more than 1 month ago. The onset quality is gradual. The problem occurs intermittently. The problem has been comes and goes. The pain is located in the generalized abdominal region. The pain is at a severity of 3/10. The pain is mild. The quality of the pain is cramping. Pertinent negatives include no fever, frequency, hematuria, nausea or vomiting. She has tried oral narcotic analgesics for the symptoms. The treatment provided mild relief. Prior diagnostic workup includes ultrasound.       Objective   Vital Signs:  /60 (BP Location: Right arm, Patient Position: Sitting, Cuff Size: Adult)   Temp 97.3 °F (36.3 °C) (Temporal)   Ht 163.8 cm (64.5\")   Wt 72.4 kg (159 lb 9.6 oz)   BMI 26.97 kg/m²   Estimated body mass index is 26.97 kg/m² as calculated from the following:    Height as of this encounter: 163.8 cm (64.5\").    Weight as of this encounter: 72.4 kg (159 lb 9.6 oz).       Physical Exam  Vitals and nursing note reviewed.   Constitutional:       Appearance: Normal appearance.   HENT:      Head: Normocephalic.      Right Ear: Tympanic membrane normal.      Left Ear: Tympanic membrane normal.      Nose: Nose normal.      Mouth/Throat:      Mouth: Mucous membranes are moist.   Eyes:      Extraocular Movements: Extraocular movements intact.      Pupils: Pupils are equal, round, and reactive to light.   Cardiovascular:      Rate and Rhythm: Normal rate and regular rhythm.      Pulses: Normal pulses.      Heart sounds: Normal heart sounds.   Pulmonary:      Effort: Pulmonary effort is normal.      Breath sounds: Normal breath sounds.   Abdominal:      General: Abdomen is flat. Bowel sounds are normal.      Palpations: Abdomen is soft.      Tenderness: " There is no abdominal tenderness.   Musculoskeletal:         General: Normal range of motion.      Cervical back: Normal range of motion and neck supple.   Skin:     General: Skin is warm.      Capillary Refill: Capillary refill takes less than 2 seconds.   Neurological:      General: No focal deficit present.      Mental Status: She is alert and oriented to person, place, and time.   Psychiatric:         Mood and Affect: Mood normal.        Result Review :  The following data was reviewed by: Hilraio Cook MD on 08/26/2024:  Common labs          3/8/2024    13:28 7/5/2024    13:54   Common Labs   Glucose 94  87    BUN 11  15    Creatinine 0.69  0.73    Sodium 142  141    Potassium 3.8  3.6    Chloride 104  105    Calcium 9.3  9.3    Albumin 4.3  4.4    Total Bilirubin 0.3  0.2    Alkaline Phosphatase 71  49    AST (SGOT) 13  14    ALT (SGPT) 11  11    WBC 5.74  7.48    Hemoglobin 11.9  12.2    Hematocrit 39.0  38.5    Platelets 336  293    Total Cholesterol 193  154    Triglycerides 156  86    HDL Cholesterol 38  46    LDL Cholesterol  127  92    Hemoglobin A1C 5.20  5.10      Data reviewed : Radiologic studies 7/26/24 US ABD/ TRANSVAGINAL           Assessment and Plan   Diagnoses and all orders for this visit:    1. Lower abdominal pain (Primary)    2. Encounter for screening mammogram for malignant neoplasm of breast  -     Mammo Screening Digital Tomosynthesis Bilateral With CAD; Future           I spent 30 minutes caring for Vero on this date of service. This time includes time spent by me in the following activities:reviewing tests, obtaining and/or reviewing a separately obtained history, performing a medically appropriate examination and/or evaluation , counseling and educating the patient/family/caregiver, ordering medications, tests, or procedures, and documenting information in the medical record       Follow Up   Return in about 1 month (around 9/26/2024) for Recheck, RTC FASTING LABS.  Patient  was given instructions and counseling regarding her condition or for health maintenance advice. Please see specific information pulled into the AVS if appropriate.           This document has been electronically signed by Hilario Cook MD  August 26, 2024 10:23 EDT

## 2024-08-29 RX ORDER — LISINOPRIL AND HYDROCHLOROTHIAZIDE 12.5; 2 MG/1; MG/1
1 TABLET ORAL
Qty: 90 TABLET | Refills: 1 | Status: SHIPPED | OUTPATIENT
Start: 2024-08-29

## 2024-08-29 NOTE — TELEPHONE ENCOUNTER
Rx Refill Note  Requested Prescriptions     Pending Prescriptions Disp Refills    lisinopril-hydrochlorothiazide (PRINZIDE,ZESTORETIC) 20-12.5 MG per tablet [Pharmacy Med Name: LISINOPRIL-HCTZ 20/12.5MG TABLETS] 90 tablet 1     Sig: TAKE 1 TABLET BY MOUTH DAILY WITH BREAKFAST      Last office visit with prescribing clinician: 8/26/2024   Last telemedicine visit with prescribing clinician: Visit date not found   Next office visit with prescribing clinician: 9/27/2024                         Would you like a call back once the refill request has been completed: [] Yes [] No    If the office needs to give you a call back, can they leave a voicemail: [] Yes [] No    Jess Guadarrama CMA  08/29/24, 12:02 EDT

## 2024-10-25 ENCOUNTER — HOSPITAL ENCOUNTER (OUTPATIENT)
Dept: MAMMOGRAPHY | Facility: HOSPITAL | Age: 49
Discharge: HOME | End: 2024-10-25
Admitting: PSYCHOLOGIST
Payer: OTHER GOVERNMENT

## 2024-10-25 DIAGNOSIS — Z12.31 ENCOUNTER FOR SCREENING MAMMOGRAM FOR MALIGNANT NEOPLASM OF BREAST: ICD-10-CM

## 2024-10-25 PROCEDURE — 77067 SCR MAMMO BI INCL CAD: CPT

## 2024-10-25 PROCEDURE — 77063 BREAST TOMOSYNTHESIS BI: CPT

## 2024-11-08 ENCOUNTER — HOSPITAL ENCOUNTER (OUTPATIENT)
Dept: MAMMOGRAPHY | Facility: HOSPITAL | Age: 49
Discharge: HOME | End: 2024-11-08
Payer: OTHER GOVERNMENT

## 2024-11-08 DIAGNOSIS — Z12.31 VISIT FOR SCREENING MAMMOGRAM: ICD-10-CM

## 2024-11-13 RX ORDER — LISINOPRIL AND HYDROCHLOROTHIAZIDE 12.5; 2 MG/1; MG/1
1 TABLET ORAL
Qty: 90 TABLET | Refills: 3 | Status: SHIPPED | OUTPATIENT
Start: 2024-11-13